# Patient Record
Sex: MALE | Race: BLACK OR AFRICAN AMERICAN | NOT HISPANIC OR LATINO | Employment: OTHER | URBAN - METROPOLITAN AREA
[De-identification: names, ages, dates, MRNs, and addresses within clinical notes are randomized per-mention and may not be internally consistent; named-entity substitution may affect disease eponyms.]

---

## 2020-09-01 ENCOUNTER — HOSPITAL ENCOUNTER (EMERGENCY)
Facility: HOSPITAL | Age: 55
Discharge: HOME | End: 2020-09-01
Attending: FAMILY MEDICINE
Payer: MEDICARE

## 2020-09-01 VITALS
SYSTOLIC BLOOD PRESSURE: 157 MMHG | WEIGHT: 295 LBS | BODY MASS INDEX: 37.86 KG/M2 | DIASTOLIC BLOOD PRESSURE: 86 MMHG | TEMPERATURE: 99 F | HEART RATE: 72 BPM | OXYGEN SATURATION: 94 % | RESPIRATION RATE: 18 BRPM | HEIGHT: 74 IN

## 2020-09-01 DIAGNOSIS — H53.8 BLURRED VISION, LEFT EYE: Primary | ICD-10-CM

## 2020-09-01 DIAGNOSIS — E87.5 HYPERKALEMIA: ICD-10-CM

## 2020-09-01 DIAGNOSIS — R73.9 HYPERGLYCEMIA: ICD-10-CM

## 2020-09-01 LAB
ALBUMIN SERPL BCP-MCNC: 4.1 G/DL (ref 3.5–5.2)
ALP SERPL-CCNC: 76 U/L (ref 55–135)
ALT SERPL W/O P-5'-P-CCNC: 16 U/L (ref 10–44)
ANION GAP SERPL CALC-SCNC: 19 MMOL/L (ref 8–16)
AST SERPL-CCNC: 16 U/L (ref 10–40)
BASOPHILS # BLD AUTO: 0.04 K/UL (ref 0–0.2)
BASOPHILS NFR BLD: 0.6 % (ref 0–1.9)
BILIRUB SERPL-MCNC: 0.6 MG/DL (ref 0.1–1)
BUN SERPL-MCNC: 70 MG/DL (ref 6–20)
CALCIUM SERPL-MCNC: 9.7 MG/DL (ref 8.7–10.5)
CHLORIDE SERPL-SCNC: 96 MMOL/L (ref 95–110)
CO2 SERPL-SCNC: 22 MMOL/L (ref 23–29)
CREAT SERPL-MCNC: 9.5 MG/DL (ref 0.5–1.4)
DIFFERENTIAL METHOD: ABNORMAL
EOSINOPHIL # BLD AUTO: 0.2 K/UL (ref 0–0.5)
EOSINOPHIL NFR BLD: 2.6 % (ref 0–8)
ERYTHROCYTE [DISTWIDTH] IN BLOOD BY AUTOMATED COUNT: 14.7 % (ref 11.5–14.5)
EST. GFR  (AFRICAN AMERICAN): 6.5 ML/MIN/1.73 M^2
EST. GFR  (NON AFRICAN AMERICAN): 5.6 ML/MIN/1.73 M^2
GLUCOSE SERPL-MCNC: 274 MG/DL (ref 70–110)
HCT VFR BLD AUTO: 41.2 % (ref 40–54)
HGB BLD-MCNC: 13.2 G/DL (ref 14–18)
IMM GRANULOCYTES # BLD AUTO: 0.03 K/UL (ref 0–0.04)
IMM GRANULOCYTES NFR BLD AUTO: 0.4 % (ref 0–0.5)
LYMPHOCYTES # BLD AUTO: 1.5 K/UL (ref 1–4.8)
LYMPHOCYTES NFR BLD: 21.2 % (ref 18–48)
MCH RBC QN AUTO: 31.1 PG (ref 27–31)
MCHC RBC AUTO-ENTMCNC: 32 G/DL (ref 32–36)
MCV RBC AUTO: 97 FL (ref 82–98)
MONOCYTES # BLD AUTO: 1.1 K/UL (ref 0.3–1)
MONOCYTES NFR BLD: 15.9 % (ref 4–15)
NEUTROPHILS # BLD AUTO: 4.1 K/UL (ref 1.8–7.7)
NEUTROPHILS NFR BLD: 59.3 % (ref 38–73)
NRBC BLD-RTO: 0 /100 WBC
PLATELET # BLD AUTO: 141 K/UL (ref 150–350)
PMV BLD AUTO: 10.7 FL (ref 9.2–12.9)
POTASSIUM SERPL-SCNC: 5.3 MMOL/L (ref 3.5–5.1)
PROT SERPL-MCNC: 8 G/DL (ref 6–8.4)
RBC # BLD AUTO: 4.25 M/UL (ref 4.6–6.2)
SODIUM SERPL-SCNC: 137 MMOL/L (ref 136–145)
WBC # BLD AUTO: 6.98 K/UL (ref 3.9–12.7)

## 2020-09-01 PROCEDURE — 85025 COMPLETE CBC W/AUTO DIFF WBC: CPT

## 2020-09-01 PROCEDURE — 80053 COMPREHEN METABOLIC PANEL: CPT

## 2020-09-01 PROCEDURE — 36000 PLACE NEEDLE IN VEIN: CPT

## 2020-09-01 PROCEDURE — 71045 X-RAY EXAM CHEST 1 VIEW: CPT | Mod: 26,,, | Performed by: RADIOLOGY

## 2020-09-01 PROCEDURE — 99285 EMERGENCY DEPT VISIT HI MDM: CPT | Mod: 25

## 2020-09-01 PROCEDURE — 70450 CT HEAD WITHOUT CONTRAST: ICD-10-PCS | Mod: 26,,, | Performed by: RADIOLOGY

## 2020-09-01 PROCEDURE — 71045 XR CHEST AP PORTABLE: ICD-10-PCS | Mod: 26,,, | Performed by: RADIOLOGY

## 2020-09-01 PROCEDURE — 70450 CT HEAD/BRAIN W/O DYE: CPT | Mod: 26,,, | Performed by: RADIOLOGY

## 2020-09-01 PROCEDURE — 70450 CT HEAD/BRAIN W/O DYE: CPT | Mod: TC

## 2020-09-01 PROCEDURE — 71045 X-RAY EXAM CHEST 1 VIEW: CPT | Mod: TC,FY

## 2020-09-01 RX ORDER — INSULIN ASPART 100 [IU]/ML
INJECTION, SUSPENSION SUBCUTANEOUS
COMMUNITY

## 2020-09-01 RX ORDER — SERTRALINE HYDROCHLORIDE 50 MG/1
50 TABLET, FILM COATED ORAL DAILY
COMMUNITY

## 2020-09-01 RX ORDER — BUSPIRONE HYDROCHLORIDE 15 MG/1
15 TABLET ORAL 3 TIMES DAILY
COMMUNITY

## 2020-09-01 RX ORDER — ATORVASTATIN CALCIUM 20 MG/1
20 TABLET, FILM COATED ORAL DAILY
COMMUNITY

## 2020-09-01 NOTE — LETTER
Patient: Julio Groves  YOB: 1965  Date: 9/1/2020 Time: 6:52 PM  Location: [facility] - ED    Leaving the Hospital Against Medical Advice    Chart #:[id number]    This will certify that I, the undersigned,    ______________________________________________________________________    A patient in the above named medical center, having requested discharge and removal from the medical [place] against the advice of my attending physician(s), hereby release [facility], its physicians, officers and employees, severally and individually, from any and all liability of any nature whatsoever for any injury or harm or complication of any kind that may result directly or indirectly, by reason of my terminating my stay as a patient at [facility] - ED and my departure from [facility], and hereby waive any and all rights of action I may now have or later acquire as a result of my voluntary departure from [facility] and the termination of my stay as a patient therein.    This release is made with the full knowledge of the danger that may result from the action which I am taking.      Date:_______________________                         ___________________________                                                                                    Patient/Legal Representative    Witness:        ____________________________                          ___________________________  Nurse                                                                        Physician

## 2020-09-01 NOTE — ED PROVIDER NOTES
Encounter Date: 9/1/2020       History     Chief Complaint   Patient presents with    Weakness    Dizziness     55-year-old male presents ambulatory complaining of feeling generally weak having spots appear before his left eye yesterday, and mildly short of breath patient is on dialysis Monday Wednesday and Friday in [facility] and is currently visiting [place] as his mother lives here, patient has a history of depression diabetes and hypertension he has not missed his dialysis and has no history of visual problems or stroke in the past        Review of patient's allergies indicates:   Allergen Reactions    Lisinopril      Past Medical History:   Diagnosis Date    Depression     Diabetes mellitus     Dialysis patient     Hypertension      History reviewed. No pertinent surgical history.  History reviewed. No pertinent family history.  Social History     Tobacco Use    Smoking status: Current Every Day Smoker   Substance Use Topics    Alcohol use: Not Currently    Drug use: Not Currently     Review of Systems   Constitutional: Negative for fever.   HENT: Negative for sore throat.    Eyes: Positive for visual disturbance. Negative for photophobia, pain, discharge, redness and itching.   Respiratory: Negative for shortness of breath.    Cardiovascular: Negative for chest pain.   Gastrointestinal: Negative for nausea.   Genitourinary: Negative for dysuria.   Musculoskeletal: Negative for back pain.   Skin: Negative for rash.   Neurological: Negative for weakness and numbness.   Hematological: Does not bruise/bleed easily.       Physical Exam     Initial Vitals [09/01/20 1708]   BP Pulse Resp Temp SpO2   (!) 143/99 83 18 98.8 °F (37.1 °C) 96 %      MAP       --         Physical Exam    Nursing note and vitals reviewed.  Constitutional: He appears well-developed and well-nourished. He is not diaphoretic. No distress.   HENT:   Head: Normocephalic and atraumatic.   Right Ear: External ear normal.   Left Ear:  External ear normal.   Nose: Nose normal.   Mouth/Throat: Oropharynx is clear and moist. No oropharyngeal exudate.   Eyes: EOM are normal. Pupils are equal, round, and reactive to light.   Neck: Normal range of motion. Neck supple. No tracheal deviation present.   Cardiovascular: Normal rate and regular rhythm.   No murmur heard.  Pulmonary/Chest: Breath sounds normal. No stridor. No respiratory distress. He has no rales.   Abdominal: Soft. He exhibits no distension and no mass. There is no abdominal tenderness. There is no rebound.   Musculoskeletal: Normal range of motion. No edema.      Comments: Both feet have poor hygiene with thick calluses but no evidence of thiago cellulitis or infection   Lymphadenopathy:     He has no cervical adenopathy.   Neurological: He is alert and oriented to person, place, and time. He has normal strength.   Skin: Skin is warm and dry. Capillary refill takes less than 2 seconds. No pallor.   Psychiatric: He has a normal mood and affect.         ED Course   Procedures  Labs Reviewed   CBC W/ AUTO DIFFERENTIAL - Abnormal; Notable for the following components:       Result Value    RBC 4.25 (*)     Hemoglobin 13.2 (*)     Mean Corpuscular Hemoglobin 31.1 (*)     RDW 14.7 (*)     Platelets 141 (*)     Mono # 1.1 (*)     Mono% 15.9 (*)     All other components within normal limits   COMPREHENSIVE METABOLIC PANEL - Abnormal; Notable for the following components:    Potassium 5.3 (*)     CO2 22 (*)     Glucose 274 (*)     BUN, Bld 70 (*)     Creatinine 9.5 (*)     Anion Gap 19 (*)     eGFR if  6.5 (*)     eGFR if non  5.6 (*)     All other components within normal limits          Imaging Results          X-Ray Chest AP Portable (Final result)  Result time 09/01/20 18:19:58    Final result by Jose Byrd MD (09/01/20 18:19:58)                 Impression:      Interstitial prominence which may be seen in the setting of interstitial edema or an atypical  infectious process.      Electronically signed by: Jose Byrd MD  Date:    09/01/2020  Time:    18:19             Narrative:    EXAMINATION:  XR CHEST AP PORTABLE    CLINICAL HISTORY:  sob;    TECHNIQUE:  Single frontal view of the chest was performed.    COMPARISON:  None    FINDINGS:  The cardiomediastinal silhouette is within normal limits.  There is prominence of interstitial markings diffusely throughout both lungs.  There is no consolidation or pleural effusion.                               CT Head Without Contrast (Final result)  Result time 09/01/20 18:21:18    Final result by Jose Byrd MD (09/01/20 18:21:18)                 Impression:      No acute intracranial process.      Electronically signed by: Jose Byrd MD  Date:    09/01/2020  Time:    18:21             Narrative:    EXAMINATION:  CT HEAD WITHOUT CONTRAST    CLINICAL HISTORY:  TIA, initial exam;    TECHNIQUE:  Low dose axial images were obtained through the head.  Coronal and sagittal reformations were also performed. Contrast was not administered.    COMPARISON:  None.    FINDINGS:  Gray-white differentiation is well maintained.  There is no intracranial hemorrhage.  There is no mass or midline shift.  The ventricles are nondilated.  The calvarium is intact.  There is moderate calcification of the intracranial ICAs.                            I have discussed case with incoming ED physician Dr. Lacey who will finish care and disposition of patient.    Patient care was assumed at shift change.  Labs had been ordered but several were still pending exchange of care.  At approximately 6:45 PM patient told his nurse that he was tired of waiting and did not want to stay any longer.  I reviewed his labs, which had recently resulted, and told the patient that his glucose was elevated, his potassium was elevated, his anion gap was elevated, technically making it possible that he is in diabetic ketoacidosis.  We discussed his  visual deficit as well.  He states that he has sensation of a film over his left eye, making his vision blurry.  He was told that this may represent a retinal injury, and optic nerve injury, etc..  I do not believe the patient has had a stroke.  His CT was normal.  He has no other neurologic deficits or symptoms.  Offered to give the patient medications for his elevated potassium and elevated glucose but he refused and stated that he wants to be discharged, and if not, he would leave A.  He stated that he would take his own insulin at home.  Patient was told that he needs to go to dialysis tomorrow morning as scheduled.  He states he gets there around 5:00 a.m..  I told him to discuss his findings with his dialysis doctor, and as soon as his dialysis is over, he needs to follow-up with his primary care doctor, and should see an ophthalmologist tomorrow as well.  Patient states he understands all of this and states he will follow through with my recommendations.  He knows that he can return here at any time if he is worse in any way.  X-Rays:   Independently Interpreted Readings:   Other Readings:  CT brain, personally reviewed by me, shows no evidence intracranial hemorrhage.  No mass, no mass effect.  No skull fracture.                                    Clinical Impression:       ICD-10-CM ICD-9-CM   1. Blurred vision, left eye  H53.8 368.8   2. Hyperkalemia  E87.5 276.7   3. Hyperglycemia  R73.9 790.29             ED Disposition Condition    Discharge Stable        ED Prescriptions     None        Follow-up Information     Follow up With Specialties Details Why Contact Info    [facility] - ED Emergency Medicine  As needed, If symptoms worsen [address] [place] [address] [phone]    Your primary care doctor  In 1 day      Your ophthalmologist  In 1 day      Your [facility]  In 1 day                                       Ted Lacey MD  09/01/20 3751

## 2020-09-01 NOTE — ED TRIAGE NOTES
Two days ago, left eye got blurred when he woke up.  Today when he was talking with physical and occupation therapist they told him to come to the ED and be checked out for a stroke. [place] stroke scale negative.  Pt had dialysis yesterday.

## 2020-09-02 NOTE — DISCHARGE INSTRUCTIONS
As we discussed, take sliding scale insulin when he gets home to lower your blood sugar.  Go your dialysis tomorrow and do not miss the appointment.  While there, talk to your dialysis doctor about his symptoms.  When you are finished with dialysis, follow-up with your primary care doctor and with Ophthalmology, as we discussed.  Finally, make an appointment with your [facility] doctors.  Return here immediately if you are worse in any way.

## 2022-06-09 ENCOUNTER — OUTSIDE PLACE OF SERVICE (OUTPATIENT)
Dept: NEPHROLOGY | Facility: CLINIC | Age: 57
End: 2022-06-09

## 2022-06-09 ENCOUNTER — OUTSIDE PLACE OF SERVICE (OUTPATIENT)
Dept: NEPHROLOGY | Facility: CLINIC | Age: 57
End: 2022-06-09
Payer: MEDICARE

## 2022-06-09 PROCEDURE — 99232 PR SUBSEQUENT HOSPITAL CARE,LEVL II: ICD-10-PCS | Mod: ,,, | Performed by: INTERNAL MEDICINE

## 2022-06-09 PROCEDURE — 99232 SBSQ HOSP IP/OBS MODERATE 35: CPT | Mod: ,,, | Performed by: INTERNAL MEDICINE

## 2022-06-10 PROCEDURE — 99232 SBSQ HOSP IP/OBS MODERATE 35: CPT | Mod: ,,, | Performed by: INTERNAL MEDICINE

## 2022-06-10 PROCEDURE — 99232 PR SUBSEQUENT HOSPITAL CARE,LEVL II: ICD-10-PCS | Mod: ,,, | Performed by: INTERNAL MEDICINE
